# Patient Record
Sex: FEMALE | Race: AMERICAN INDIAN OR ALASKA NATIVE
[De-identification: names, ages, dates, MRNs, and addresses within clinical notes are randomized per-mention and may not be internally consistent; named-entity substitution may affect disease eponyms.]

---

## 2020-04-02 ENCOUNTER — HOSPITAL ENCOUNTER (INPATIENT)
Dept: HOSPITAL 5 - 3A | Age: 30
LOS: 4 days | Discharge: HOME | End: 2020-04-06
Attending: OBSTETRICS & GYNECOLOGY | Admitting: OBSTETRICS & GYNECOLOGY
Payer: MEDICAID

## 2020-04-02 DIAGNOSIS — Z3A.27: ICD-10-CM

## 2020-04-02 DIAGNOSIS — O42.012: ICD-10-CM

## 2020-04-02 DIAGNOSIS — Z83.3: ICD-10-CM

## 2020-04-02 DIAGNOSIS — D64.9: ICD-10-CM

## 2020-04-02 DIAGNOSIS — Z82.49: ICD-10-CM

## 2020-04-02 LAB
BACTERIA #/AREA URNS HPF: (no result) /HPF
BASOPHILS # (AUTO): 0 K/MM3 (ref 0–0.1)
BASOPHILS NFR BLD AUTO: 0.2 % (ref 0–1.8)
BILIRUB UR QL STRIP: (no result)
BLOOD UR QL VISUAL: (no result)
EOSINOPHIL # BLD AUTO: 0 K/MM3 (ref 0–0.4)
EOSINOPHIL NFR BLD AUTO: 0 % (ref 0–4.3)
HCT VFR BLD CALC: 30.9 % (ref 30.3–42.9)
HGB BLD-MCNC: 10.1 GM/DL (ref 10.1–14.3)
LYMPHOCYTES # BLD AUTO: 0.9 K/MM3 (ref 1.2–5.4)
LYMPHOCYTES NFR BLD AUTO: 5.5 % (ref 13.4–35)
MCHC RBC AUTO-ENTMCNC: 33 % (ref 30–34)
MCV RBC AUTO: 84 FL (ref 79–97)
MONOCYTES # (AUTO): 1.1 K/MM3 (ref 0–0.8)
MONOCYTES % (AUTO): 7.1 % (ref 0–7.3)
PH UR STRIP: 8 [PH] (ref 5–7)
PLATELET # BLD: 213 K/MM3 (ref 140–440)
RBC # BLD AUTO: 3.67 M/MM3 (ref 3.65–5.03)
RBC #/AREA URNS HPF: 37 /HPF (ref 0–6)
UROBILINOGEN UR-MCNC: < 2 MG/DL (ref ?–2)
WBC #/AREA URNS HPF: > 182 /HPF (ref 0–6)

## 2020-04-02 PROCEDURE — 86901 BLOOD TYPING SEROLOGIC RH(D): CPT

## 2020-04-02 PROCEDURE — 85018 HEMOGLOBIN: CPT

## 2020-04-02 PROCEDURE — 87086 URINE CULTURE/COLONY COUNT: CPT

## 2020-04-02 PROCEDURE — 83735 ASSAY OF MAGNESIUM: CPT

## 2020-04-02 PROCEDURE — 85025 COMPLETE CBC W/AUTO DIFF WBC: CPT

## 2020-04-02 PROCEDURE — 85014 HEMATOCRIT: CPT

## 2020-04-02 PROCEDURE — 81001 URINALYSIS AUTO W/SCOPE: CPT

## 2020-04-02 PROCEDURE — 86850 RBC ANTIBODY SCREEN: CPT

## 2020-04-02 PROCEDURE — 76815 OB US LIMITED FETUS(S): CPT

## 2020-04-02 PROCEDURE — 76816 OB US FOLLOW-UP PER FETUS: CPT

## 2020-04-02 PROCEDURE — 88305 TISSUE EXAM BY PATHOLOGIST: CPT

## 2020-04-02 PROCEDURE — 86900 BLOOD TYPING SEROLOGIC ABO: CPT

## 2020-04-02 PROCEDURE — 36415 COLL VENOUS BLD VENIPUNCTURE: CPT

## 2020-04-02 RX ADMIN — BETAMETHASONE SODIUM PHOSPHATE AND BETAMETHASONE ACETATE SCH MG: 3; 3 INJECTION, SUSPENSION INTRA-ARTICULAR; INTRALESIONAL; INTRAMUSCULAR at 14:50

## 2020-04-02 RX ADMIN — AMPICILLIN SCH MLS/HR: 2 INJECTION, POWDER, FOR SOLUTION INTRAVENOUS at 22:00

## 2020-04-02 RX ADMIN — MAGNESIUM SULFATE HEPTAHYDRATE SCH MLS/HR: 40 INJECTION, SOLUTION INTRAVENOUS at 16:22

## 2020-04-02 RX ADMIN — AMPICILLIN SCH MLS/HR: 2 INJECTION, POWDER, FOR SOLUTION INTRAVENOUS at 15:53

## 2020-04-02 NOTE — ULTRASOUND REPORT
Limited OB Ultrasound



HISTORY: Fetal position, GEORGETTE.  Premature rupture of membranes.



TECHNIQUE:  Grayscale and color  imaging performed.



COMPARISON:  None



FINDINGS: There is a single viable intrauterine gestation which is cephalic in presentation. One of t
he extremities appears to be interposed between the head and the cervix. The GEORGETTE is 1 cm which is dec
reased. Heart rate is 149 bpm.



IMPRESSION: 

1. Cephalic presentation with an extremity interposed between the head and the cervix.

2. Abnormal low GEORGETTE consistent with history.



Signer Name: Rome Patricio MD 

Signed: 4/2/2020 3:49 PM

Workstation Name: [id number]

## 2020-04-02 NOTE — CONSULTATION
Prenatal Consult Note





- Parent Education


I met with parent(s) and discussed the following:: Need for NICU admission, Poss

ible need for intubation and surfactant or other resp support, Temperature 

regulation, Head ultrasounds to evaluate IVH, Possible need for IV fluids/TPN 

and IV antibiotics, Possible need for umbilical lines, Importance of providing 

breast milk & encouraged pumping aft delivery, Donor breast milk if baby meets 

criteria after birth, Slow feeding advancement and monitoring of tolerance. 

NG/OG feeds, Need to monitor for jaundice, Data for survival & survival without 

significant co-morbidities


Parent(s) demonstrated understanding of all the information:: Yes


Additional Comment: Mother mandy consistently during consult. Offered to 

return at a later time, mother declined





Assessment and Plan





- Assessment


Gestation:: 27


Estimated Fetal Weight: unknown


Baby's gender: Female


Additional Comment: Mother presented to L&D after OB office determined mother 

was ruptured an has been leaking for approx. 1 week per mother. PNR negative, 

GBS unknown, prenatal course complicated by EVANS on US and referred to MFM but 

did not make it to appointment yet per records.





- Plan


Plan: 


Agree with Mag & steroids


Will attend delivery


Please call NICU with questions

## 2020-04-02 NOTE — HISTORY AND PHYSICAL REPORT
History of Present Illness


Date of examination: 20


Date of admission: 


20 13:49





Chief complaint: 


Sent from office for rupture of membranes.


History of present illness: 


Pt is a 28 yo  at 27w3d EGA by 18wk US not c/w LMP who presents from 

[facility] for gross rupture of membranes diagnosed at the office. 

She reports leaking fluid for the past week. She had thought that it was a 

bacterial infection. She is not feeling fetal movement today. She denies vaginal

bleeding. She reports contractions every time she leaks fluid. Her prenatal co

jazmín has been complicated by EIF.





Past History


Past Medical History: other ("fluid around the heart")


Past Surgical History: D&C


Family/Genetic History: diabetes, hypertension


Social history: no significant social history





- Obstetrical History


Expected Date of Delivery: 20


Actual Gestation: 27 Week(s) 3 Day(s) 


: 6


Para: 1


Hx # Term Pregnancies: 1


Induced : 4


Number of Living Children: 1





Medications and Allergies


                                    Allergies











Allergy/AdvReac Type Severity Reaction Status Date / Time


 


No Known Allergies Allergy   Verified 20 11:37











Active Meds: 


Active Medications





Acetaminophen (Tylenol)  650 mg PO Q4H PRN


   PRN Reason: Pain MILD(1-3)/Fever >100.5/HA


Betamethasone Acet/Betameth SodPhos (Celestone Soluspan)  12 mg IM Q24H DARRIN


   Stop: 20 13:01


   Last Admin: 20 14:50 Dose:  12 mg


   Documented by: 


Diphenhydramine HCl (Benadryl)  25 mg PO Q6H PRN


   PRN Reason: Itching


Docusate Sodium (Colace)  100 mg PO Q12H PRN


   PRN Reason: Constipation


Lactated Ringer's (Lactated Ringers)  1,000 mls @ 125 mls/hr IV AS DIRECT DARRIN


Ampicillin Sodium (Ampicillin/Ns 2 Gm/100 Ml)  2 gm in 100 mls @ 100 mls/hr IV 

Q6HR DARRIN; Protocol


   Stop: 20 06:59


Magnesium Sulfate (Magnesium Sulfate 40gm/1000ml)  40 gm in 1,000 mls @ 50 

mls/hr IV AS DIRECT DARRIN


Multivitamins/Iron/Calcium (Prenatal Vitamin)  1 each PO QDAY DARRIN


Ondansetron HCl (Zofran)  4 mg IV Q6H PRN


   PRN Reason: Nausea And Vomiting


Witch Hazel/Glycerin (Tucks Pad)  1 each TP PRN PRN


   PRN Reason: Hemorrhoids











Review of Systems


All systems: negative


Genitourinary: leakage of fluid, contractions, no vaginal bleeding, no dysuria


Psychiatric: sadness/tearfullness (given circumstance of hospital admission)





- Vital Signs


Vital signs: 


                                   Vital Signs











Pulse Pulse Ox


 


 117 H  93 


 


 20 14:15  20 14:15








                                        











Temp Pulse Resp BP Pulse Ox


 


 98.6 F   101 H  20   93/57   94 


 


 20 14:17  20 15:28  20 14:17  20 14:17  20 15:28














- Physical Exam


Lungs: Positive: Normal air movement


Abdomen: Positive: soft


Genitourinary (Female): Positive: normal external genitalia


Cervix: Positive: friable


Uterus: Positive: enlarged (gravid, fundal height 28cm)


Extremities: Positive: normal





- Obstetrical


FHR: category 1





Results


Result Diagrams: 


                                 20 15:30





                              Abnormal lab results











  20 Range/Units





  14:02 


 


Urine pH  8.0 H  (5.0-7.0)  


 


Urine WBC (Auto)  > 182.0 H  (0.0-6.0)  /HPF








All other labs normal.








Assessment and Plan


A:


29 yo  at 27w3d EGA


 prelabor rupture of membranes, clear





P:


Admit to antepartum service


Betamethasone for surfactant development


Magnesium sulfate for neuroprotection and tocolysis


Latency antibiotics


Continuous EFM


Ultrasound for presentation and GEORGETTE


MD aware of patient








Aye Haines CNM

## 2020-04-03 RX ADMIN — SODIUM CHLORIDE, SODIUM LACTATE, POTASSIUM CHLORIDE, AND CALCIUM CHLORIDE SCH MLS/HR: .6; .31; .03; .02 INJECTION, SOLUTION INTRAVENOUS at 13:26

## 2020-04-03 RX ADMIN — MAGNESIUM SULFATE HEPTAHYDRATE SCH MLS/HR: 40 INJECTION, SOLUTION INTRAVENOUS at 13:42

## 2020-04-03 RX ADMIN — ERYTHROMYCIN LACTOBIONATE SCH MLS/HR: 500 INJECTION, POWDER, LYOPHILIZED, FOR SOLUTION INTRAVENOUS at 21:43

## 2020-04-03 RX ADMIN — SODIUM CHLORIDE, SODIUM LACTATE, POTASSIUM CHLORIDE, AND CALCIUM CHLORIDE SCH MLS/HR: .6; .31; .03; .02 INJECTION, SOLUTION INTRAVENOUS at 01:36

## 2020-04-03 RX ADMIN — AMPICILLIN SCH MLS/HR: 2 INJECTION, POWDER, FOR SOLUTION INTRAVENOUS at 01:35

## 2020-04-03 RX ADMIN — BETAMETHASONE SODIUM PHOSPHATE AND BETAMETHASONE ACETATE SCH MG: 3; 3 INJECTION, SUSPENSION INTRA-ARTICULAR; INTRALESIONAL; INTRAMUSCULAR at 13:38

## 2020-04-03 RX ADMIN — SODIUM CHLORIDE, SODIUM LACTATE, POTASSIUM CHLORIDE, AND CALCIUM CHLORIDE SCH MLS/HR: .6; .31; .03; .02 INJECTION, SOLUTION INTRAVENOUS at 21:42

## 2020-04-03 RX ADMIN — ERYTHROMYCIN LACTOBIONATE SCH MLS/HR: 500 INJECTION, POWDER, LYOPHILIZED, FOR SOLUTION INTRAVENOUS at 12:45

## 2020-04-03 RX ADMIN — AMPICILLIN SCH MLS/HR: 2 INJECTION, POWDER, FOR SOLUTION INTRAVENOUS at 10:07

## 2020-04-03 NOTE — CONSULTATION
History of Present Illness


Consult date: 20


Reason for consult: PROM


History of present illness: 





Ms. Simmons is a 30 y/o  at 27+4 wks GA who is admitted in the setting of 

 prelabor rupture of membranes.  In discussion with the patient, the 

patient states that she started having leakage of clear fluid from the vagina 

last week but did not contact her OBGYN.   She presented to a routine outpatient

appointment yesterday and was found to be grossly ruptured and admitted to the 

hospital.  She has been placed on latency antibiotics, magnesium sulfate and 

betamethasone.





Currently, she denies CTX/VB.  She reports leakage of fluid per vagina.   +FM. 

The patient states that she had a cervical exam and was told that she is 2cm 

dilated.





Past History


Past Medical History: other ("fluid around the heart")


Past Surgical History: D&C


Family/Genetic History: diabetes, hypertension





- Obstetrical History


: 6


Para: 1


Hx # Term Pregnancies: 1


Spontaneous Abortions: 4


Number of Living Children: 1





Medications and Allergies


                                    Allergies











Allergy/AdvReac Type Severity Reaction Status Date / Time


 


No Known Allergies Allergy   Verified 20 11:37











Active Meds: 


Active Medications





Acetaminophen (Tylenol)  650 mg PO Q4H PRN


   PRN Reason: Pain MILD(1-3)/Fever >100.5/HA


   Last Admin: 20 16:10 Dose:  650 mg


   Documented by: 


Betamethasone Acet/Betameth SodPhos (Celestone Soluspan)  12 mg IM Q24H DARRIN


   Stop: 20 13:01


   Last Admin: 20 14:50 Dose:  12 mg


   Documented by: 


Butorphanol Tartrate (Stadol)  2 mg IV Q2H PRN


   PRN Reason: Labor Pain


Diphenhydramine HCl (Benadryl)  25 mg PO Q6H PRN


   PRN Reason: Itching


Docusate Sodium (Colace)  100 mg PO Q12H PRN


   PRN Reason: Constipation


Lactated Ringer's (Lactated Ringers)  1,000 mls @ 125 mls/hr IV AS DIRECT DARRIN


   Last Admin: 20 01:36 Dose:  125 mls/hr


   Documented by: 


Ampicillin Sodium (Ampicillin/Ns 2 Gm/100 Ml)  2 gm in 100 mls @ 100 mls/hr IV 

Q6HR DARRIN; Protocol


   Stop: 20 06:59


   Last Admin: 20 10:07 Dose:  100 mls/hr


   Documented by: 


Magnesium Sulfate (Magnesium Sulfate 40gm/1000ml)  40 gm in 1,000 mls @ 50 

mls/hr IV AS DIRECT DARRIN


   Last Admin: 20 16:22 Dose:  2 gm/hr, 50 mls/hr


   Documented by: 


Ceftriaxone Sodium (Rocephin/Ns 1 Gm/50 Ml)  1 gm in 50 mls @ 100 mls/hr IV 

Q24HR DARRIN; Protocol


Erythromycin Lactobionate 250 (mg/ Sodium Chloride)  100 mls @ 100 mls/hr IV 

Q6HR DARRIN; Protocol


   Stop: 20 06:59


Multivitamins/Iron/Calcium (Prenatal Vitamin)  1 each PO QDAY DARRIN


   Last Admin: 20 10:07 Dose:  1 each


   Documented by: 


Ondansetron HCl (Zofran)  4 mg IV Q6H PRN


   PRN Reason: Nausea And Vomiting


Promethazine HCl (Phenergan)  25 mg PO Q6H PRN


   PRN Reason: Nausea And Vomiting


Witch Hazel/Glycerin (Tucks Pad)  1 each TP PRN PRN


   PRN Reason: Hemorrhoids











- Vital Signs


Vital signs: 


                                   Vital Signs











Pulse Pulse Ox


 


 117 H  93 


 


 20 14:15  20 14:15








                                        











Temp Pulse Resp BP Pulse Ox


 


 98.4 F   85   14   90/57   100 


 


 20 10:12  20 10:46  20 06:40  20 10:11  20 10:46














- Physical Exam


Breasts: Positive: deferred


Abdomen: Positive: other (No ruq/epigastric tenderness.  No fundal tenderness.  

)


Uterus: Positive: other (Non tender fundus)





Results


Result Diagrams: 


                                 20 15:30





                              Abnormal lab results











  20 Range/Units





  14:02 15:30 19:56 


 


WBC   16.0 H   (4.5-11.0)  K/mm3


 


Lymph % (Auto)   5.5 L   (13.4-35.0)  %


 


Lymph #   0.9 L   (1.2-5.4)  K/mm3


 


Mono #   1.1 H   (0.0-0.8)  K/mm3


 


Seg Neutrophils %   87.2 H   (40.0-70.0)  %


 


Seg Neutrophils #   14.0 H   (1.8-7.7)  K/mm3


 


Magnesium    5.70 H  (1.7-2.3)  mg/dL


 


Urine pH  8.0 H    (5.0-7.0)  


 


Urine WBC (Auto)  > 182.0 H    (0.0-6.0)  /HPF














  20 Range/Units





  00:50 


 


WBC   (4.5-11.0)  K/mm3


 


Lymph % (Auto)   (13.4-35.0)  %


 


Lymph #   (1.2-5.4)  K/mm3


 


Mono #   (0.0-0.8)  K/mm3


 


Seg Neutrophils %   (40.0-70.0)  %


 


Seg Neutrophils #   (1.8-7.7)  K/mm3


 


Magnesium  6.40 H  (1.7-2.3)  mg/dL


 


Urine pH   (5.0-7.0)  


 


Urine WBC (Auto)   (0.0-6.0)  /HPF








All other labs normal.





Ultrasound: report reviewed





Assessment and Plan





IMPRESSIONS: 


IUP at 27+4 weeks gestation


 prelabor rupture of membranes


2cm dilated


s/p Neonatology consultation





RECOMMENDATIONS:





I reviewed the pathophysiology of PPROM with the patient today and discussed 

that because of PPROM, the pregnancy is now exposed to the outside vaginal 

claudia, which increases the risk for infection, inflammation, intra-amniotic 

infection.  Patients with PPROM also are at risk for cord prolapse, placental 

abruption and IUFD.  Therefore, inpatient management until delivery is 

recommended.


   Administerer betamethasone as you are, complete the steroid course today.


   Continue latency antibiotics per protocol as you are


   Magnesium sulfate for neuroprotection per protocol, will be completed today 

for a total of 24 hours


   Tocolysis in the setting of PPROM is CONTRAINDICATED, as this can mask ab

ruption and intra-amniotic infection


   Obtain GBS PCR


   Daily NSTs


   BPPs weekly


   Please obtain a growth ultrasound now and every 3-4 weeks 


   Please obtain a Neonatology consultation


   The patient should be delivered at 34 weeks, if the patient demonstrates 

reassuring maternal-fetal status.  


   Reasons for earlier delivery would include the following:   signs of intra-

amniotic infection (abnormal discharge, maternal fever, maternal/fetal 

tachycardia refractory to intervention, sepsis, placental abruption, cord 

prolapse, non-reassuring fetal status, progression of labor process beyond 5 

cm).  The patient was instructed on fetal kick counts and signs of infection 

today.


   When the patient is to be induced, please initiate GBS prophylaxis, if 

indicated


   PPROM is not a contraindication to vaginal delivery.   delivery 

should be reserved for the routine obstetrical indications


   Reviewed with the patient that greater than 50% of patients will go on to 

deliver within the next week.


   Discussed that bedrest is CONTRAINDICATED, as this can increase the risk of 

VTE but also maternal de-conditioning.





These recommendations were called to Dr. Kimberly Rojas at 1053AM.

## 2020-04-03 NOTE — ULTRASOUND REPORT
ULTRASOUND OBSTETRIC 



Indication:

estimate fetal growth



Findings:

There is a single intrauterine pregnancy. 



BPD = 6.5 cm = 26 weeks, 1 day(s).

Head circumference = 23.7 cm = 25 weeks, 6 day(s).

Abdominal circumference = 22 cm = 26 weeks, 3 day(s).

Femur length = 4.9 cm = 26 weeks, 4 day(s).



Overall estimated sonographic age = 26 weeks, 2 day(s).



Fetal heart rate is 141  beats per minute. 

Estimated fetal birth weight is 979  grams



Fetal position is cephalic. 

Cervix appears closed. 

Fetal movement is present. 



Impression:

1. Single living intrauterine pregnancy with estimated sonographic age of 26 weeks, 2  day(s).

2. No sonographic abnormality identified.



Signer Name: Elliott Davis MD 

Signed: 4/3/2020 3:54 PM

Workstation Name: [id number]

## 2020-04-03 NOTE — PROGRESS NOTE
Assessment and Plan





A/P 


IUP 27 weks


PPROM


PTL





s/p betamethasone


mag for neuroprotections


abx latency started today


s/p niccu consult


awaiting [facility] consult 


added abx for possible uti ( rocephin) 





Subjective





- Subjective


Date of service: 04/03/20


Principal diagnosis: PPROM, UTI


Patient reports: loss of fluid, fetal movement normal, no new complaints, no 

vaginal bleeding





Objective





- Vital Signs


Vital Signs: 


                               Vital Signs - 12hr











  04/02/20 04/02/20 04/02/20





  19:58 20:00 20:02


 


Temperature   


 


Pulse Rate 92 H 108 H 99 H


 


Respiratory   





Rate   


 


Blood Pressure   


 


O2 Sat by Pulse 100 100 100





Oximetry   














  04/02/20 04/02/20 04/02/20





  20:04 20:06 20:08


 


Temperature   


 


Pulse Rate 99 H 101 H 100 H


 


Respiratory   





Rate   


 


Blood Pressure   


 


O2 Sat by Pulse 100 100 100





Oximetry   














  04/02/20 04/02/20 04/02/20





  20:10 20:11 20:12


 


Temperature   


 


Pulse Rate 100 H 101 H 96 H


 


Respiratory   





Rate   


 


Blood Pressure   100/57


 


O2 Sat by Pulse 100 0 L 100





Oximetry   














  04/02/20 04/02/20 04/02/20





  20:14 20:16 20:18


 


Temperature   


 


Pulse Rate 98 H 103 H 100 H


 


Respiratory   





Rate   


 


Blood Pressure   


 


O2 Sat by Pulse 100 100 100





Oximetry   














  04/02/20 04/02/20 04/02/20





  20:20 20:22 20:24


 


Temperature   


 


Pulse Rate 98 H 94 H 102 H


 


Respiratory   





Rate   


 


Blood Pressure   


 


O2 Sat by Pulse 100 100 100





Oximetry   














  04/02/20 04/02/20 04/02/20





  20:26 20:28 20:30


 


Temperature   


 


Pulse Rate 97 H 104 H 95 H


 


Respiratory   





Rate   


 


Blood Pressure   


 


O2 Sat by Pulse 100 100 100





Oximetry   














  04/02/20 04/02/20 04/02/20





  20:32 20:34 20:36


 


Temperature   


 


Pulse Rate 99 H 97 H 99 H


 


Respiratory   





Rate   


 


Blood Pressure   


 


O2 Sat by Pulse 100 100 100





Oximetry   














  04/02/20 04/02/20 04/02/20





  20:38 20:40 20:42


 


Temperature   


 


Pulse Rate 98 H 97 H 99 H


 


Respiratory   





Rate   


 


Blood Pressure   


 


O2 Sat by Pulse 100 99 100





Oximetry   














  04/02/20 04/02/20 04/02/20





  20:44 20:46 20:48


 


Temperature   


 


Pulse Rate 98 H 95 H 96 H


 


Respiratory   





Rate   


 


Blood Pressure   


 


O2 Sat by Pulse 100 100 100





Oximetry   














  04/02/20 04/02/20 04/02/20





  20:50 20:52 20:54


 


Temperature   


 


Pulse Rate 100 H 97 H 99 H


 


Respiratory   





Rate   


 


Blood Pressure   


 


O2 Sat by Pulse 100 100 100





Oximetry   














  04/02/20 04/02/20 04/02/20





  20:56 20:58 21:00


 


Temperature   


 


Pulse Rate 105 H 97 H 98 H


 


Respiratory   





Rate   


 


Blood Pressure   


 


O2 Sat by Pulse 99 100 100





Oximetry   














  04/02/20 04/02/20 04/02/20





  21:02 21:04 21:06


 


Temperature   


 


Pulse Rate 99 H 104 H 103 H


 


Respiratory   





Rate   


 


Blood Pressure   


 


O2 Sat by Pulse 100 100 100





Oximetry   














  04/02/20 04/02/20 04/02/20





  21:08 21:10 21:11


 


Temperature   


 


Pulse Rate 97 H 100 H 101 H


 


Respiratory   





Rate   


 


Blood Pressure   102/61


 


O2 Sat by Pulse 100 100 





Oximetry   














  04/02/20 04/02/20 04/02/20





  21:12 21:14 21:16


 


Temperature   


 


Pulse Rate 101 H 100 H 102 H


 


Respiratory   





Rate   


 


Blood Pressure   


 


O2 Sat by Pulse 100 100 100





Oximetry   














  04/02/20 04/02/20 04/02/20





  21:18 21:20 21:22


 


Temperature   


 


Pulse Rate 100 H 103 H 98 H


 


Respiratory   





Rate   


 


Blood Pressure   


 


O2 Sat by Pulse 100 99 100





Oximetry   














  04/02/20 04/02/20 04/02/20





  21:24 21:26 21:28


 


Temperature   


 


Pulse Rate 105 H 103 H 99 H


 


Respiratory   





Rate   


 


Blood Pressure   


 


O2 Sat by Pulse 100 100 100





Oximetry   














  04/02/20 04/02/20 04/02/20





  21:30 21:32 21:34


 


Temperature   


 


Pulse Rate 103 H 99 H 102 H


 


Respiratory   





Rate   


 


Blood Pressure   


 


O2 Sat by Pulse 100 100 100





Oximetry   














  04/02/20 04/02/20 04/02/20





  21:36 21:38 21:40


 


Temperature   


 


Pulse Rate 94 H 100 H 99 H


 


Respiratory   





Rate   


 


Blood Pressure   


 


O2 Sat by Pulse 100 100 100





Oximetry   














  04/02/20 04/02/20 04/02/20





  21:42 21:44 21:46


 


Temperature   


 


Pulse Rate 102 H 99 H 96 H


 


Respiratory   





Rate   


 


Blood Pressure   


 


O2 Sat by Pulse 100 100 100





Oximetry   














  04/02/20 04/02/20 04/02/20





  21:48 21:50 21:52


 


Temperature   


 


Pulse Rate 100 H 101 H 103 H


 


Respiratory   





Rate   


 


Blood Pressure   


 


O2 Sat by Pulse 100 100 99





Oximetry   














  04/02/20 04/02/20 04/02/20





  21:54 21:56 21:58


 


Temperature   


 


Pulse Rate 99 H 90 99 H


 


Respiratory   





Rate   


 


Blood Pressure   


 


O2 Sat by Pulse 100 100 100





Oximetry   














  04/02/20 04/02/20 04/02/20





  22:00 22:02 22:04


 


Temperature   


 


Pulse Rate 101 H 100 H 99 H


 


Respiratory   





Rate   


 


Blood Pressure   


 


O2 Sat by Pulse 98 98 100





Oximetry   














  04/02/20 04/02/20 04/02/20





  22:06 22:08 22:10


 


Temperature   


 


Pulse Rate 98 H 98 H 98 H


 


Respiratory   





Rate   


 


Blood Pressure   


 


O2 Sat by Pulse 100 100 100





Oximetry   














  04/02/20 04/02/20 04/02/20





  22:11 22:12 22:14


 


Temperature   


 


Pulse Rate 100 H 101 H 100 H


 


Respiratory   





Rate   


 


Blood Pressure 95/62  


 


O2 Sat by Pulse  100 99





Oximetry   














  04/02/20 04/02/20 04/02/20





  22:16 22:18 22:20


 


Temperature   


 


Pulse Rate 96 H 91 H 96 H


 


Respiratory   





Rate   


 


Blood Pressure   


 


O2 Sat by Pulse 97 100 100





Oximetry   














  04/02/20 04/02/20 04/02/20





  22:22 22:24 22:26


 


Temperature   


 


Pulse Rate 94 H 95 H 96 H


 


Respiratory   





Rate   


 


Blood Pressure   


 


O2 Sat by Pulse 98 100 100





Oximetry   














  04/02/20 04/02/20 04/02/20





  22:28 22:30 22:32


 


Temperature   


 


Pulse Rate 95 H 100 H 95 H


 


Respiratory   





Rate   


 


Blood Pressure   


 


O2 Sat by Pulse 100 100 100





Oximetry   














  04/02/20 04/02/20 04/02/20





  22:34 22:36 22:38


 


Temperature   


 


Pulse Rate 94 H 95 H 92 H


 


Respiratory   





Rate   


 


Blood Pressure   


 


O2 Sat by Pulse 100 100 99





Oximetry   














  04/02/20 04/02/20 04/02/20





  22:39 22:40 22:42


 


Temperature 97.9 F  


 


Pulse Rate  94 H 92 H


 


Respiratory  16 





Rate   


 


Blood Pressure   


 


O2 Sat by Pulse  100 99





Oximetry   














  04/02/20 04/02/20 04/02/20





  22:44 22:46 22:48


 


Temperature   


 


Pulse Rate 87 95 H 98 H


 


Respiratory   





Rate   


 


Blood Pressure   


 


O2 Sat by Pulse 99 98 100





Oximetry   














  04/02/20 04/02/20 04/02/20





  22:50 22:52 22:54


 


Temperature   


 


Pulse Rate 94 H 96 H 94 H


 


Respiratory   





Rate   


 


Blood Pressure   


 


O2 Sat by Pulse 100 100 100





Oximetry   














  04/02/20 04/02/20 04/02/20





  22:56 22:58 23:00


 


Temperature   


 


Pulse Rate 99 H 94 H 93 H


 


Respiratory   





Rate   


 


Blood Pressure   


 


O2 Sat by Pulse 100 100 100





Oximetry   














  04/02/20 04/02/20 04/02/20





  23:02 23:04 23:06


 


Temperature   


 


Pulse Rate 93 H 94 H 93 H


 


Respiratory   





Rate   


 


Blood Pressure   


 


O2 Sat by Pulse 100 100 98





Oximetry   














  04/02/20 04/02/20 04/02/20





  23:07 23:08 23:10


 


Temperature   


 


Pulse Rate 89 94 H 94 H


 


Respiratory   





Rate   


 


Blood Pressure   


 


O2 Sat by Pulse 94 98 98





Oximetry   














  04/02/20 04/02/20 04/02/20





  23:11 23:12 23:14


 


Temperature   


 


Pulse Rate 90 92 H 94 H


 


Respiratory   





Rate   


 


Blood Pressure 90/53  


 


O2 Sat by Pulse  98 100





Oximetry   














  04/02/20 04/02/20 04/02/20





  23:16 23:18 23:20


 


Temperature   


 


Pulse Rate 92 H 93 H 97 H


 


Respiratory   





Rate   


 


Blood Pressure   


 


O2 Sat by Pulse 100 100 100





Oximetry   














  04/02/20 04/02/20 04/02/20





  23:22 23:24 23:26


 


Temperature   


 


Pulse Rate 99 H 93 H 94 H


 


Respiratory   





Rate   


 


Blood Pressure   


 


O2 Sat by Pulse 99 100 99





Oximetry   














  04/02/20 04/02/20 04/02/20





  23:28 23:30 23:32


 


Temperature   


 


Pulse Rate 96 H 97 H 95 H


 


Respiratory   





Rate   


 


Blood Pressure   


 


O2 Sat by Pulse 97 100 100





Oximetry   














  04/02/20 04/02/20 04/02/20





  23:34 23:36 23:38


 


Temperature   


 


Pulse Rate 93 H 97 H 107 H


 


Respiratory   





Rate   


 


Blood Pressure   


 


O2 Sat by Pulse 100 98 100





Oximetry   














  04/02/20 04/02/20 04/02/20





  23:40 23:42 23:44


 


Temperature 98.1 F  


 


Pulse Rate 98 H 91 H 95 H


 


Respiratory 16  





Rate   


 


Blood Pressure   


 


O2 Sat by Pulse 99 100 100





Oximetry   














  04/02/20 04/02/20 04/02/20





  23:46 23:48 23:50


 


Temperature   


 


Pulse Rate 95 H 94 H 91 H


 


Respiratory   





Rate   


 


Blood Pressure   


 


O2 Sat by Pulse 100 99 100





Oximetry   














  04/02/20 04/02/20 04/02/20





  23:52 23:54 23:56


 


Temperature   


 


Pulse Rate 92 H 94 H 97 H


 


Respiratory   





Rate   


 


Blood Pressure   


 


O2 Sat by Pulse 100 100 100





Oximetry   














  04/02/20 04/03/20 04/03/20





  23:58 00:00 00:02


 


Temperature   


 


Pulse Rate 95 H 97 H 90


 


Respiratory   





Rate   


 


Blood Pressure   


 


O2 Sat by Pulse 100 100 100





Oximetry   














  04/03/20 04/03/20 04/03/20





  00:04 00:06 00:08


 


Temperature   


 


Pulse Rate 94 H 95 H 90


 


Respiratory   





Rate   


 


Blood Pressure   


 


O2 Sat by Pulse 99 99 99





Oximetry   














  04/03/20 04/03/20 04/03/20





  00:10 00:11 00:12


 


Temperature   


 


Pulse Rate 90 86 87


 


Respiratory   





Rate   


 


Blood Pressure  92/59 


 


O2 Sat by Pulse 100  100





Oximetry   














  04/03/20 04/03/20 04/03/20





  00:14 00:16 00:18


 


Temperature   


 


Pulse Rate 87 89 87


 


Respiratory   





Rate   


 


Blood Pressure   


 


O2 Sat by Pulse 100 99 100





Oximetry   














  04/03/20 04/03/20 04/03/20





  00:20 00:22 00:24


 


Temperature   


 


Pulse Rate 93 H 92 H 91 H


 


Respiratory   





Rate   


 


Blood Pressure   


 


O2 Sat by Pulse 100 99 96





Oximetry   














  04/03/20 04/03/20 04/03/20





  00:26 00:28 00:30


 


Temperature   


 


Pulse Rate 90 89 93 H


 


Respiratory   





Rate   


 


Blood Pressure   


 


O2 Sat by Pulse 100 100 100





Oximetry   














  04/03/20 04/03/20 04/03/20





  00:32 00:34 00:36


 


Temperature   


 


Pulse Rate 94 H 94 H 88


 


Respiratory   





Rate   


 


Blood Pressure   


 


O2 Sat by Pulse 100 100 100





Oximetry   














  04/03/20 04/03/20 04/03/20





  00:38 00:40 00:42


 


Temperature   


 


Pulse Rate 94 H 94 H 92 H


 


Respiratory  14 





Rate   


 


Blood Pressure   


 


O2 Sat by Pulse 99 99 99





Oximetry   














  04/03/20 04/03/20 04/03/20





  00:44 00:46 00:48


 


Temperature   


 


Pulse Rate 90 90 91 H


 


Respiratory   





Rate   


 


Blood Pressure   


 


O2 Sat by Pulse 98 98 98





Oximetry   














  04/03/20 04/03/20 04/03/20





  00:50 00:52 00:54


 


Temperature   


 


Pulse Rate 97 H 88 94 H


 


Respiratory   





Rate   


 


Blood Pressure   


 


O2 Sat by Pulse 99 100 100





Oximetry   














  04/03/20 04/03/20 04/03/20





  00:56 00:58 01:00


 


Temperature   


 


Pulse Rate 102 H 93 H 91 H


 


Respiratory   





Rate   


 


Blood Pressure   


 


O2 Sat by Pulse 97 100 100





Oximetry   














  04/03/20 04/03/20 04/03/20





  01:02 01:04 01:06


 


Temperature   


 


Pulse Rate 87 95 H 98 H


 


Respiratory   





Rate   


 


Blood Pressure   


 


O2 Sat by Pulse 100 100 100





Oximetry   














  04/03/20 04/03/20 04/03/20





  01:08 01:10 01:11


 


Temperature   98.4 F


 


Pulse Rate 97 H 79 94 H


 


Respiratory   





Rate   


 


Blood Pressure   95/53


 


O2 Sat by Pulse 100 98 





Oximetry   














  04/03/20 04/03/20 04/03/20





  01:12 01:14 01:16


 


Temperature   


 


Pulse Rate 102 H 100 H 94 H


 


Respiratory   





Rate   


 


Blood Pressure   


 


O2 Sat by Pulse 100 100 100





Oximetry   














  04/03/20 04/03/20 04/03/20





  01:18 01:20 01:22


 


Temperature   


 


Pulse Rate 94 H 91 H 94 H


 


Respiratory   





Rate   


 


Blood Pressure   


 


O2 Sat by Pulse 100 99 100





Oximetry   














  04/03/20 04/03/20 04/03/20





  01:24 01:26 01:28


 


Temperature   


 


Pulse Rate 92 H 91 H 93 H


 


Respiratory   





Rate   


 


Blood Pressure   


 


O2 Sat by Pulse 100 100 100





Oximetry   














  04/03/20 04/03/20 04/03/20





  01:30 01:32 01:34


 


Temperature   


 


Pulse Rate 92 H 90 94 H


 


Respiratory   





Rate   


 


Blood Pressure   


 


O2 Sat by Pulse 99 99 99





Oximetry   














  04/03/20 04/03/20 04/03/20





  01:36 01:38 01:40


 


Temperature   


 


Pulse Rate 95 H 86 89


 


Respiratory   16





Rate   


 


Blood Pressure   


 


O2 Sat by Pulse 98 98 100





Oximetry   














  04/03/20 04/03/20 04/03/20





  01:42 01:44 01:46


 


Temperature   


 


Pulse Rate 88 90 90


 


Respiratory   





Rate   


 


Blood Pressure   


 


O2 Sat by Pulse 99 97 99





Oximetry   














  04/03/20 04/03/20 04/03/20





  01:48 01:50 01:52


 


Temperature   


 


Pulse Rate 92 H 92 H 92 H


 


Respiratory   





Rate   


 


Blood Pressure   


 


O2 Sat by Pulse 98 97 97





Oximetry   














  04/03/20 04/03/20 04/03/20





  01:54 01:56 01:58


 


Temperature   


 


Pulse Rate 93 H 93 H 95 H


 


Respiratory   





Rate   


 


Blood Pressure   


 


O2 Sat by Pulse 98 97 97





Oximetry   














  04/03/20 04/03/20 04/03/20





  02:00 02:02 02:04


 


Temperature   


 


Pulse Rate 94 H 95 H 96 H


 


Respiratory   





Rate   


 


Blood Pressure   


 


O2 Sat by Pulse 97 97 97





Oximetry   














  04/03/20 04/03/20 04/03/20





  02:06 02:08 02:10


 


Temperature   


 


Pulse Rate 95 H 96 H 93 H


 


Respiratory   





Rate   


 


Blood Pressure   


 


O2 Sat by Pulse 97 98 97





Oximetry   














  04/03/20 04/03/20 04/03/20





  02:11 02:12 02:14


 


Temperature   


 


Pulse Rate 93 H 94 H 97 H


 


Respiratory   





Rate   


 


Blood Pressure 91/54  


 


O2 Sat by Pulse  97 98





Oximetry   














  04/03/20 04/03/20 04/03/20





  02:16 02:18 02:20


 


Temperature   


 


Pulse Rate 94 H 92 H 97 H


 


Respiratory   





Rate   


 


Blood Pressure   


 


O2 Sat by Pulse 97 98 98





Oximetry   














  04/03/20 04/03/20 04/03/20





  02:22 02:24 02:26


 


Temperature   


 


Pulse Rate 92 H 95 H 92 H


 


Respiratory   





Rate   


 


Blood Pressure   


 


O2 Sat by Pulse 98 98 98





Oximetry   














  04/03/20 04/03/20 04/03/20





  02:28 02:30 02:32


 


Temperature   


 


Pulse Rate 94 H 94 H 89


 


Respiratory   





Rate   


 


Blood Pressure   


 


O2 Sat by Pulse 98 98 99





Oximetry   














  04/03/20 04/03/20 04/03/20





  02:34 02:36 02:38


 


Temperature   


 


Pulse Rate 94 H 88 89


 


Respiratory   





Rate   


 


Blood Pressure   


 


O2 Sat by Pulse 97 97 97





Oximetry   














  04/03/20 04/03/20 04/03/20





  02:40 02:42 02:44


 


Temperature 98.0 F  


 


Pulse Rate 87 89 98 H


 


Respiratory 14  





Rate   


 


Blood Pressure   


 


O2 Sat by Pulse 98 97 98





Oximetry   














  04/03/20 04/03/20 04/03/20





  02:46 02:48 02:50


 


Temperature   


 


Pulse Rate 92 H 86 86


 


Respiratory   





Rate   


 


Blood Pressure   


 


O2 Sat by Pulse 98 98 99





Oximetry   














  04/03/20 04/03/20 04/03/20





  02:52 02:54 02:56


 


Temperature   


 


Pulse Rate 86 83 84


 


Respiratory   





Rate   


 


Blood Pressure   


 


O2 Sat by Pulse 99 99 97





Oximetry   














  04/03/20 04/03/20 04/03/20





  02:58 03:00 03:02


 


Temperature   


 


Pulse Rate 83 82 86


 


Respiratory   





Rate   


 


Blood Pressure   


 


O2 Sat by Pulse 98 98 98





Oximetry   














  04/03/20 04/03/20 04/03/20





  03:04 03:06 03:08


 


Temperature   


 


Pulse Rate 85 86 88


 


Respiratory   





Rate   


 


Blood Pressure   


 


O2 Sat by Pulse 98 98 98





Oximetry   














  04/03/20 04/03/20 04/03/20





  03:10 03:11 03:12


 


Temperature   


 


Pulse Rate 85 95 H 85


 


Respiratory   





Rate   


 


Blood Pressure  90/55 


 


O2 Sat by Pulse 98  98





Oximetry   














  04/03/20 04/03/20 04/03/20





  03:14 03:16 03:18


 


Temperature   


 


Pulse Rate 86 85 86


 


Respiratory   





Rate   


 


Blood Pressure   


 


O2 Sat by Pulse 98 98 98





Oximetry   














  04/03/20 04/03/20 04/03/20





  03:20 03:22 03:24


 


Temperature   


 


Pulse Rate 87 87 87


 


Respiratory   





Rate   


 


Blood Pressure   


 


O2 Sat by Pulse 98 98 98





Oximetry   














  04/03/20 04/03/20 04/03/20





  03:26 03:28 03:30


 


Temperature   


 


Pulse Rate 86 88 93 H


 


Respiratory   





Rate   


 


Blood Pressure   


 


O2 Sat by Pulse 98 98 98





Oximetry   














  04/03/20 04/03/20 04/03/20





  03:32 03:34 03:36


 


Temperature   


 


Pulse Rate 90 83 95 H


 


Respiratory   





Rate   


 


Blood Pressure   


 


O2 Sat by Pulse 98 98 99





Oximetry   














  04/03/20 04/03/20 04/03/20





  03:38 03:40 03:42


 


Temperature  98.3 F 


 


Pulse Rate 98 H 92 H 95 H


 


Respiratory  14 





Rate   


 


Blood Pressure   


 


O2 Sat by Pulse 100 99 98





Oximetry   














  04/03/20 04/03/20 04/03/20





  03:44 03:46 03:48


 


Temperature   


 


Pulse Rate 84 85 85


 


Respiratory   





Rate   


 


Blood Pressure   


 


O2 Sat by Pulse 100 100 100





Oximetry   














  04/03/20 04/03/20 04/03/20





  03:50 03:52 03:54


 


Temperature   


 


Pulse Rate 88 88 84


 


Respiratory   





Rate   


 


Blood Pressure   


 


O2 Sat by Pulse 100 100 100





Oximetry   














  04/03/20 04/03/20 04/03/20





  03:56 03:58 04:00


 


Temperature   


 


Pulse Rate 83 85 87


 


Respiratory   





Rate   


 


Blood Pressure   


 


O2 Sat by Pulse 99 100 99





Oximetry   














  04/03/20 04/03/20 04/03/20





  04:02 04:04 04:06


 


Temperature   


 


Pulse Rate 86 86 84


 


Respiratory   





Rate   


 


Blood Pressure   


 


O2 Sat by Pulse 99 99 99





Oximetry   














  04/03/20 04/03/20 04/03/20





  04:08 04:10 04:11


 


Temperature   


 


Pulse Rate 80 82 90


 


Respiratory   





Rate   


 


Blood Pressure   100/58


 


O2 Sat by Pulse 98 98 





Oximetry   














  04/03/20 04/03/20 04/03/20





  04:12 04:14 04:16


 


Temperature   


 


Pulse Rate 85 85 85


 


Respiratory   





Rate   


 


Blood Pressure   


 


O2 Sat by Pulse 99 98 98





Oximetry   














  04/03/20 04/03/20 04/03/20





  04:18 04:20 04:22


 


Temperature   


 


Pulse Rate 86 86 85


 


Respiratory   





Rate   


 


Blood Pressure   


 


O2 Sat by Pulse 98 98 98





Oximetry   














  04/03/20 04/03/20 04/03/20





  04:24 04:26 04:28


 


Temperature   


 


Pulse Rate 87 86 86


 


Respiratory   





Rate   


 


Blood Pressure   


 


O2 Sat by Pulse 98 98 98





Oximetry   














  04/03/20 04/03/20 04/03/20





  04:30 04:32 04:34


 


Temperature   


 


Pulse Rate 82 88 83


 


Respiratory   





Rate   


 


Blood Pressure   


 


O2 Sat by Pulse 99 99 98





Oximetry   














  04/03/20 04/03/20 04/03/20





  04:36 04:38 04:40


 


Temperature   


 


Pulse Rate 83 86 88


 


Respiratory   16





Rate   


 


Blood Pressure   


 


O2 Sat by Pulse 98 99 98





Oximetry   














  04/03/20 04/03/20 04/03/20





  04:42 04:44 04:46


 


Temperature   


 


Pulse Rate 89 89 91 H


 


Respiratory   





Rate   


 


Blood Pressure   


 


O2 Sat by Pulse 98 98 98





Oximetry   














  04/03/20 04/03/20 04/03/20





  04:48 04:50 04:52


 


Temperature   


 


Pulse Rate 89 90 93 H


 


Respiratory   





Rate   


 


Blood Pressure   


 


O2 Sat by Pulse 98 98 96





Oximetry   














  04/03/20 04/03/20 04/03/20





  04:54 04:56 04:58


 


Temperature   


 


Pulse Rate 93 H 87 84


 


Respiratory   





Rate   


 


Blood Pressure   


 


O2 Sat by Pulse 98 98 98





Oximetry   














  04/03/20 04/03/20 04/03/20





  05:00 05:02 05:04


 


Temperature   


 


Pulse Rate 85 87 85


 


Respiratory   





Rate   


 


Blood Pressure   


 


O2 Sat by Pulse 98 98 98





Oximetry   














  04/03/20 04/03/20 04/03/20





  05:06 05:08 05:10


 


Temperature   


 


Pulse Rate 82 84 84


 


Respiratory   





Rate   


 


Blood Pressure   


 


O2 Sat by Pulse 98 98 98





Oximetry   














  04/03/20 04/03/20 04/03/20





  05:11 05:12 05:14


 


Temperature   


 


Pulse Rate 85 84 84


 


Respiratory   





Rate   


 


Blood Pressure 92/58  


 


O2 Sat by Pulse  99 98





Oximetry   














  04/03/20 04/03/20 04/03/20





  05:16 05:18 05:20


 


Temperature   


 


Pulse Rate 95 H 88 87


 


Respiratory   





Rate   


 


Blood Pressure   


 


O2 Sat by Pulse 96 99 100





Oximetry   














  04/03/20 04/03/20 04/03/20





  05:22 05:24 05:26


 


Temperature   


 


Pulse Rate 85 82 81


 


Respiratory   





Rate   


 


Blood Pressure   


 


O2 Sat by Pulse 100 100 100





Oximetry   














  04/03/20 04/03/20 04/03/20





  05:28 05:30 05:32


 


Temperature   


 


Pulse Rate 84 82 90


 


Respiratory   





Rate   


 


Blood Pressure   


 


O2 Sat by Pulse 100 100 100





Oximetry   














  04/03/20 04/03/20 04/03/20





  05:34 05:36 05:38


 


Temperature   


 


Pulse Rate 87 74 85


 


Respiratory   





Rate   


 


Blood Pressure   


 


O2 Sat by Pulse 100 93 100





Oximetry   














  04/03/20 04/03/20 04/03/20





  05:40 05:42 05:44


 


Temperature 98.2 F  


 


Pulse Rate 84 83 84


 


Respiratory 14  





Rate   


 


Blood Pressure   


 


O2 Sat by Pulse 98 100 100





Oximetry   














  04/03/20 04/03/20 04/03/20





  05:46 05:48 05:50


 


Temperature   


 


Pulse Rate 85 87 85


 


Respiratory   





Rate   


 


Blood Pressure   


 


O2 Sat by Pulse 100 99 100





Oximetry   














  04/03/20 04/03/20 04/03/20





  05:52 05:54 05:56


 


Temperature   


 


Pulse Rate 87 85 85


 


Respiratory   





Rate   


 


Blood Pressure   


 


O2 Sat by Pulse 100 100 100





Oximetry   














  04/03/20 04/03/20 04/03/20





  05:58 06:00 06:02


 


Temperature   


 


Pulse Rate 82 85 83


 


Respiratory   





Rate   


 


Blood Pressure   


 


O2 Sat by Pulse 100 98 99





Oximetry   














  04/03/20 04/03/20 04/03/20





  06:04 06:06 06:08


 


Temperature   


 


Pulse Rate 82 85 83


 


Respiratory   





Rate   


 


Blood Pressure   


 


O2 Sat by Pulse 98 99 98





Oximetry   














  04/03/20 04/03/20 04/03/20





  06:10 06:11 06:12


 


Temperature   


 


Pulse Rate 86 90 94 H


 


Respiratory   





Rate   


 


Blood Pressure  88/54 


 


O2 Sat by Pulse 98  99





Oximetry   














  04/03/20 04/03/20 04/03/20





  06:14 06:16 06:18


 


Temperature   


 


Pulse Rate 84 83 83


 


Respiratory   





Rate   


 


Blood Pressure 89/58  


 


O2 Sat by Pulse 99 98 98





Oximetry   














  04/03/20 04/03/20 04/03/20





  06:20 06:22 06:24


 


Temperature   


 


Pulse Rate 84 86 87


 


Respiratory   





Rate   


 


Blood Pressure   


 


O2 Sat by Pulse 98 98 98





Oximetry   














  04/03/20 04/03/20 04/03/20





  06:26 06:28 06:30


 


Temperature   


 


Pulse Rate 86 88 89


 


Respiratory   





Rate   


 


Blood Pressure   


 


O2 Sat by Pulse 98 98 98





Oximetry   














  04/03/20 04/03/20 04/03/20





  06:32 06:34 06:36


 


Temperature   


 


Pulse Rate 90 90 91 H


 


Respiratory   





Rate   


 


Blood Pressure   


 


O2 Sat by Pulse 98 98 98





Oximetry   














  04/03/20 04/03/20 04/03/20





  06:38 06:40 06:42


 


Temperature   


 


Pulse Rate 91 H 89 89


 


Respiratory  14 





Rate   


 


Blood Pressure   


 


O2 Sat by Pulse 98 99 99





Oximetry   














  04/03/20 04/03/20 04/03/20





  06:44 06:46 06:48


 


Temperature   


 


Pulse Rate 94 H 91 H 91 H


 


Respiratory   





Rate   


 


Blood Pressure   


 


O2 Sat by Pulse 99 99 99





Oximetry   














  04/03/20 04/03/20 04/03/20





  06:50 06:51 06:52


 


Temperature  98.1 F 


 


Pulse Rate 93 H  88


 


Respiratory   





Rate   


 


Blood Pressure   


 


O2 Sat by Pulse 99  100





Oximetry   














  04/03/20 04/03/20 04/03/20





  06:54 06:55 06:56


 


Temperature   


 


Pulse Rate 91 H 91 H 89


 


Respiratory   





Rate   


 


Blood Pressure   


 


O2 Sat by Pulse 81 L 76 L 100





Oximetry   














  04/03/20 04/03/20 04/03/20





  06:58 07:00 07:02


 


Temperature   


 


Pulse Rate 82 84 81


 


Respiratory   





Rate   


 


Blood Pressure   


 


O2 Sat by Pulse 100 93 100





Oximetry   














  04/03/20 04/03/20 04/03/20





  07:04 07:06 07:08


 


Temperature   


 


Pulse Rate 90 85 86


 


Respiratory   





Rate   


 


Blood Pressure   


 


O2 Sat by Pulse 99 100 100





Oximetry   














  04/03/20 04/03/20 04/03/20





  07:10 07:11 07:12


 


Temperature   


 


Pulse Rate 86 85 88


 


Respiratory   





Rate   


 


Blood Pressure  93/57 


 


O2 Sat by Pulse 100  98





Oximetry   














  04/03/20 04/03/20 04/03/20





  07:14 07:16 07:18


 


Temperature   


 


Pulse Rate 89 89 88


 


Respiratory   





Rate   


 


Blood Pressure   


 


O2 Sat by Pulse 100 100 99





Oximetry   














  04/03/20 04/03/20 04/03/20





  07:19 07:20 07:22


 


Temperature 97.7 F  


 


Pulse Rate  88 83


 


Respiratory   





Rate   


 


Blood Pressure   


 


O2 Sat by Pulse  100 100





Oximetry   














  04/03/20 04/03/20 04/03/20





  07:24 07:26 07:28


 


Temperature   


 


Pulse Rate 83 91 H 90


 


Respiratory   





Rate   


 


Blood Pressure   


 


O2 Sat by Pulse 100 100 100





Oximetry   














  04/03/20 04/03/20 04/03/20





  07:30 07:32 07:34


 


Temperature   


 


Pulse Rate 93 H 90 86


 


Respiratory   





Rate   


 


Blood Pressure   


 


O2 Sat by Pulse 100 99 100





Oximetry   














  04/03/20 04/03/20 04/03/20





  07:36 07:38 07:40


 


Temperature   


 


Pulse Rate 83 85 85


 


Respiratory   





Rate   


 


Blood Pressure   


 


O2 Sat by Pulse 100 99 99





Oximetry   














  04/03/20 04/03/20 04/03/20





  07:42 07:44 07:46


 


Temperature   


 


Pulse Rate 88 86 85


 


Respiratory   





Rate   


 


Blood Pressure   


 


O2 Sat by Pulse 98 98 98





Oximetry   














  04/03/20 04/03/20 04/03/20





  07:48 07:50 07:52


 


Temperature   


 


Pulse Rate 88 90 88


 


Respiratory   





Rate   


 


Blood Pressure   


 


O2 Sat by Pulse 98 98 98





Oximetry   














  04/03/20





  07:54


 


Temperature 


 


Pulse Rate 88


 


Respiratory 





Rate 


 


Blood Pressure 


 


O2 Sat by Pulse 98





Oximetry 














- Exam


Breasts: normal


Cardiovascular: Regular rate, Normal S1


Lungs: Clear to auscultation, Normal air movement


Abdomen: Present: normal appearance, soft, normal bowel sounds.  Absent: 

distention, tenderness, guarding


Vulva: both: normal


Uterus: Present: normal, firm, fundal height below umbilicus.  Absent: 

bogginess, tenderness


FHR: category 1


Cervical Dilatation: 2


Uterine Contraction Pattern: Irregular


Uterine Tone Measurement Phase: Contraction


Uterine Contraction Intensity: Mild


Extremities: normal


Deep Tendon Reflex Grade: Normal +2





- Labs


Labs: 


                                  Abnormal Labs











  04/02/20 04/02/20 04/02/20





  14:02 15:30 19:56


 


WBC   16.0 H 


 


Lymph % (Auto)   5.5 L 


 


Lymph #   0.9 L 


 


Mono #   1.1 H 


 


Seg Neutrophils %   87.2 H 


 


Seg Neutrophils #   14.0 H 


 


Magnesium    5.70 H


 


Urine pH  8.0 H  


 


Urine WBC (Auto)  > 182.0 H  














  04/03/20





  00:50


 


WBC 


 


Lymph % (Auto) 


 


Lymph # 


 


Mono # 


 


Seg Neutrophils % 


 


Seg Neutrophils # 


 


Magnesium  6.40 H


 


Urine pH 


 


Urine WBC (Auto) 








                         Laboratory Results - last 24 hr











  04/02/20 04/02/20 04/02/20





  14:02 15:30 15:30


 


WBC   16.0 H 


 


RBC   3.67 


 


Hgb   10.1 


 


Hct   30.9 


 


MCV   84 


 


MCH   28 


 


MCHC   33 


 


RDW   14.5 


 


Plt Count   213 


 


Lymph % (Auto)   5.5 L 


 


Mono % (Auto)   7.1 


 


Eos % (Auto)   0.0 


 


Baso % (Auto)   0.2 


 


Lymph #   0.9 L 


 


Mono #   1.1 H 


 


Eos #   0.0 


 


Baso #   0.0 


 


Seg Neutrophils %   87.2 H 


 


Seg Neutrophils #   14.0 H 


 


Magnesium   


 


Urine Color  Yellow  


 


Urine Turbidity  Cloudy  


 


Urine pH  8.0 H  


 


Ur Specific Gravity  1.008  


 


Urine Protein  30 mg/dl  


 


Urine Glucose (UA)  Neg  


 


Urine Ketones  Tr  


 


Urine Blood  Sm  


 


Urine Nitrite  Neg  


 


Urine Bilirubin  Neg  


 


Urine Urobilinogen  < 2.0  


 


Ur Leukocyte Esterase  Lg  


 


Urine WBC (Auto)  > 182.0 H  


 


Urine RBC (Auto)  37.0  


 


U Epithel Cells (Auto)  2.0  


 


Urine Bacteria (Auto)  1+  


 


Urine WBC Clumps  3+  


 


Blood Type    O POSITIVE


 


Antibody Screen    Negative














  04/02/20 04/03/20





  19:56 00:50


 


WBC  


 


RBC  


 


Hgb  


 


Hct  


 


MCV  


 


MCH  


 


MCHC  


 


RDW  


 


Plt Count  


 


Lymph % (Auto)  


 


Mono % (Auto)  


 


Eos % (Auto)  


 


Baso % (Auto)  


 


Lymph #  


 


Mono #  


 


Eos #  


 


Baso #  


 


Seg Neutrophils %  


 


Seg Neutrophils #  


 


Magnesium  5.70 H  6.40 H


 


Urine Color  


 


Urine Turbidity  


 


Urine pH  


 


Ur Specific Gravity  


 


Urine Protein  


 


Urine Glucose (UA)  


 


Urine Ketones  


 


Urine Blood  


 


Urine Nitrite  


 


Urine Bilirubin  


 


Urine Urobilinogen  


 


Ur Leukocyte Esterase  


 


Urine WBC (Auto)  


 


Urine RBC (Auto)  


 


U Epithel Cells (Auto)  


 


Urine Bacteria (Auto)  


 


Urine WBC Clumps  


 


Blood Type  


 


Antibody Screen

## 2020-04-04 LAB
HCT VFR BLD CALC: 27.5 % (ref 30.3–42.9)
HGB BLD-MCNC: 9 GM/DL (ref 10.1–14.3)

## 2020-04-04 RX ADMIN — IBUPROFEN SCH MG: 600 TABLET, FILM COATED ORAL at 17:23

## 2020-04-04 RX ADMIN — AMPICILLIN SCH MLS/HR: 2 INJECTION, POWDER, FOR SOLUTION INTRAVENOUS at 00:15

## 2020-04-04 RX ADMIN — ERYTHROMYCIN LACTOBIONATE SCH MLS/HR: 500 INJECTION, POWDER, LYOPHILIZED, FOR SOLUTION INTRAVENOUS at 04:10

## 2020-04-04 RX ADMIN — SODIUM CHLORIDE, SODIUM LACTATE, POTASSIUM CHLORIDE, AND CALCIUM CHLORIDE SCH MLS/HR: .6; .31; .03; .02 INJECTION, SOLUTION INTRAVENOUS at 08:53

## 2020-04-04 RX ADMIN — DOCUSATE SODIUM SCH: 100 CAPSULE, LIQUID FILLED ORAL at 22:03

## 2020-04-04 RX ADMIN — IBUPROFEN SCH MG: 600 TABLET, FILM COATED ORAL at 11:06

## 2020-04-04 RX ADMIN — IBUPROFEN SCH MG: 600 TABLET, FILM COATED ORAL at 23:38

## 2020-04-04 RX ADMIN — AMPICILLIN SCH MLS/HR: 2 INJECTION, POWDER, FOR SOLUTION INTRAVENOUS at 06:01

## 2020-04-04 NOTE — PROCEDURE NOTE
OB Delivery Note





- Delivery


Date of Delivery: 20


Surgeon: SILVERIO NAVA


Estimated blood loss: 200cc





- Vaginal


Delivery presentation: vertex


Delivery position: OA


Intrapartum events:  labor-<37 weeks, PROM->1hr before delivery, 

precipitous labor- <3hr


Delivery induction: none


Delivery monitor: external FHT, external uterine


Route of delivery: 


Delivery placenta: spontaneous


Delivery cord: 3 umbilical vessels


Episiotomy: none


Delivery laceration: none


Anesthesia: none


Delivery comments: 


Viable female  delivered over intact perineum precipitously. Weight      

.

## 2020-04-05 RX ADMIN — DOCUSATE SODIUM SCH MG: 100 CAPSULE, LIQUID FILLED ORAL at 12:00

## 2020-04-05 RX ADMIN — Medication SCH EACH: at 12:00

## 2020-04-05 RX ADMIN — DOCUSATE SODIUM SCH: 100 CAPSULE, LIQUID FILLED ORAL at 21:12

## 2020-04-05 RX ADMIN — IBUPROFEN SCH MG: 600 TABLET, FILM COATED ORAL at 05:16

## 2020-04-05 RX ADMIN — IBUPROFEN SCH MG: 600 TABLET, FILM COATED ORAL at 12:00

## 2020-04-05 RX ADMIN — IBUPROFEN SCH MG: 600 TABLET, FILM COATED ORAL at 18:24

## 2020-04-05 NOTE — PROGRESS NOTE
Assessment and Plan





PPD 1 s/p . Doing well. Plan for discharge on tomorrow. Infant in NICU





Subjective





- Subjective


Date of service: 20


Principal diagnosis: PPROM, UTI


Patient reports: appetite normal, voiding normally, pain well controlled


: in NICU





Objective





- Vital Signs


Latest vital signs: 


                                   Vital Signs











  Temp Pulse Resp BP Pulse Ox


 


 20 08:26  99.3 F  72  20  97/63  98


 


 20 23:52  98.0 F  75  20  95/58  98


 


 20 16:19  98.6 F  71  20  92/61  100








                                Intake and Output











 20





 22:59 06:59 14:59


 


Intake Total 480 240 480


 


Output Total 400  


 


Balance 80 240 480


 


Intake:   


 


  Oral 480 240 480


 


Output:   


 


  Urine 400  


 


    Void 400  


 


Other:   


 


  Total, Intake Amount 480 240 480


 


  Total, Output Amount 400  


 


  # Voids   


 


    Void 2 1 2














- Exam


Breasts: Present: deferred


Cardiovascular: Present: Regular rate, Normal S1, Normal S2


Lungs: Present: Clear to auscultation, Normal air movement


Abdomen: Present: normal appearance, soft, normal bowel sounds


Vulva: both: normal


Uterus: Present: normal, firm


Extremities: Present: normal





- Labs


Labs: 


                              Abnormal lab results











  20 Range/Units





  20:33 


 


Hgb  9.0 L  (10.1-14.3)  gm/dl


 


Hct  27.5 L  (30.3-42.9)  %

## 2020-04-06 VITALS — SYSTOLIC BLOOD PRESSURE: 111 MMHG | DIASTOLIC BLOOD PRESSURE: 75 MMHG

## 2020-04-06 RX ADMIN — IBUPROFEN SCH: 600 TABLET, FILM COATED ORAL at 00:05

## 2020-04-06 RX ADMIN — DOCUSATE SODIUM SCH MG: 100 CAPSULE, LIQUID FILLED ORAL at 09:22

## 2020-04-06 RX ADMIN — Medication SCH EACH: at 09:21

## 2020-04-06 NOTE — DISCHARGE SUMMARY
Providers





- Providers


Date of Admission: 


20 13:49





Date of discharge: 20


Attending physician: 


MAE SPRAGUE





Primary care physician: 


MAE SPRAGUE








Hospitalization


Reason for admission: rupture of membranes


Delivery: 


Procedure details: 





Please see delivery note. 


Episiotomy: none


Laceration: none


Other postpartum procedures: none


Postpartum complications: none


Discharge diagnosis:  delivery


 baby: female


Hospital course: 





Pt admitted for PPROM and went on to have a  which she tolerated well. Her 

postpartum course was uncomplicated. She met discharge criteria on PPD#2. She 

will follow up in 2 wks. 


Condition at discharge: Stable


Disposition: - TO HOME OR SELFCARE





- Discharge Diagnoses


(1)  premature rupture of membranes (PPROM) delivered, current 

hospitalization


Status: Acute   





(2)  delivery


Status: Acute   





(3) Anemia


Status: Acute   


Qualifiers: 


   Anemia type: unspecified type   Qualified Code(s): D64.9 - Anemia, 

unspecified   





Plan





- Discharge Medications


Prescriptions: 


Ferrous Sulfate [Feosol 325 MG tab] 325 mg PO BID #60 tablet


Ibuprofen [Motrin] 600 mg PO Q6H PRN #30 tablet


 PRN Reason: Pain





- Provider Discharge Summary


Activity: routine, no sex for 6 weeks, no heavy lifting 4 weeks, no strenuous 

exercise


Diet: routine


Instructions: routine


Additional instructions: 


[]  Smoking cessation referral if applicable(refer to patient education folder 

for contact #)


[]  Refer to [facility]'s [facility] Booklet








Call your doctor immediately for:


* Fever > 100.5


* Heavy vaginal bleeding ( >1 pad per hour)


* Severe persistent headache


* Shortness of breath


* Reddened, hot, painful area to leg or breast


* Drainage or odor from incision.





* Keep incision clean and dry at all times and follow doctor's instructions 

regarding bathing/showering











- Follow up plan


Follow up: 


NAZANIN AMAYA CNM [Advanced Practice Nurse] - 14 Days (Please schedule a 

follow up )

## 2020-04-06 NOTE — PROGRESS NOTE
Assessment and Plan





A: PPD#2 s/p  delivery at 27 wks after PPROM


P: Routine care. Discharge later today with follow up in 2 wks 





Subjective





- Subjective


Date of service: 20


Principal diagnosis: PPROM, UTI


Interval history: 





Pt feels well. She reports some swelling of elbows and knees since taking 

ibuprofen. 


Patient reports: appetite normal, voiding normally, pain well controlled, 

ambulating normally


: in NICU





Objective





- Vital Signs


Latest vital signs: 


                                   Vital Signs











  Temp Pulse Resp BP Pulse Ox


 


 20 00:40  97.7 F  66  20  101/66  100


 


 20 16:34  97.9 F  63  20  102/66  100


 


 20 08:26  99.3 F  72  20  97/63  98








                                Intake and Output











 20





 22:59 06:59 14:59


 


Intake Total 240 360 


 


Balance 240 360 


 


Intake:   


 


  Oral 240  


 


  Intake, Free Water  360 


 


Other:   


 


  Total, Intake Amount 240  


 


  # Voids   


 


    Void 1 1 














- Exam


Breasts: Present: deferred


Abdomen: Present: soft


Uterus: Present: fundal height below umbilicus


Extremities: Present: normal